# Patient Record
Sex: FEMALE | Race: WHITE | NOT HISPANIC OR LATINO | Employment: FULL TIME | URBAN - METROPOLITAN AREA
[De-identification: names, ages, dates, MRNs, and addresses within clinical notes are randomized per-mention and may not be internally consistent; named-entity substitution may affect disease eponyms.]

---

## 2022-02-14 ENCOUNTER — NON-PROVIDER VISIT (OUTPATIENT)
Dept: OCCUPATIONAL MEDICINE | Facility: CLINIC | Age: 32
End: 2022-02-14

## 2022-02-14 ENCOUNTER — EH NON-PROVIDER (OUTPATIENT)
Dept: OCCUPATIONAL MEDICINE | Facility: CLINIC | Age: 32
End: 2022-02-14

## 2022-02-14 PROCEDURE — 94375 RESPIRATORY FLOW VOLUME LOOP: CPT | Performed by: NURSE PRACTITIONER

## 2023-02-10 ENCOUNTER — DOCUMENTATION (OUTPATIENT)
Dept: OCCUPATIONAL MEDICINE | Facility: CLINIC | Age: 33
End: 2023-02-10

## 2023-02-10 NOTE — PROGRESS NOTES
Pt has an appointment for Pre-Employment Physical with [facility] on 02/14/23.    Currently missing the following vaccine documentation:      Flu Shot    Contacted via email on 02/10/23, requesting missing documentation. If unable to obtain by the appointment  date, declination, and/or vaccines will be given.

## 2023-02-14 ENCOUNTER — EH NON-PROVIDER (OUTPATIENT)
Dept: OCCUPATIONAL MEDICINE | Facility: CLINIC | Age: 33
End: 2023-02-14

## 2023-02-14 ENCOUNTER — HOSPITAL ENCOUNTER (OUTPATIENT)
Facility: MEDICAL CENTER | Age: 33
End: 2023-02-14
Attending: NURSE PRACTITIONER
Payer: COMMERCIAL

## 2023-02-14 ENCOUNTER — EMPLOYEE HEALTH (OUTPATIENT)
Dept: OCCUPATIONAL MEDICINE | Facility: CLINIC | Age: 33
End: 2023-02-14

## 2023-02-14 DIAGNOSIS — Z02.89 ENCOUNTER FOR OCCUPATIONAL HEALTH ASSESSMENT: ICD-10-CM

## 2023-02-14 DIAGNOSIS — Z02.1 PRE-EMPLOYMENT HEALTH SCREENING EXAMINATION: ICD-10-CM

## 2023-02-14 DIAGNOSIS — Z02.1 PRE-EMPLOYMENT DRUG SCREENING: ICD-10-CM

## 2023-02-14 LAB
AMP AMPHETAMINE: NORMAL
BAR BARBITURATES: NORMAL
BZO BENZODIAZEPINES: NORMAL
COC COCAINE: NORMAL
INT CON NEG: NORMAL
INT CON POS: NORMAL
MDMA ECSTASY: NORMAL
MET METHAMPHETAMINES: NORMAL
MTD METHADONE: NORMAL
OPI OPIATES: NORMAL
OXY OXYCODONE: NORMAL
PCP PHENCYCLIDINE: NORMAL
POC URINE DRUG SCREEN OCDRS: NEGATIVE
THC: NORMAL

## 2023-02-14 PROCEDURE — 8915 PR COMPREHENSIVE PHYSICAL: Performed by: NURSE PRACTITIONER

## 2023-02-14 PROCEDURE — 80305 DRUG TEST PRSMV DIR OPT OBS: CPT | Performed by: NURSE PRACTITIONER

## 2023-02-14 PROCEDURE — 94375 RESPIRATORY FLOW VOLUME LOOP: CPT | Performed by: NURSE PRACTITIONER

## 2023-02-14 PROCEDURE — 86480 TB TEST CELL IMMUN MEASURE: CPT | Performed by: NURSE PRACTITIONER

## 2023-02-17 ENCOUNTER — EH NON-PROVIDER (OUTPATIENT)
Dept: OCCUPATIONAL MEDICINE | Facility: CLINIC | Age: 33
End: 2023-02-17

## 2023-02-17 LAB
GAMMA INTERFERON BACKGROUND BLD IA-ACNC: 0.03 IU/ML
M TB IFN-G BLD-IMP: NEGATIVE
M TB IFN-G CD4+ BCKGRND COR BLD-ACNC: 0 IU/ML
MITOGEN IGNF BCKGRD COR BLD-ACNC: >10 IU/ML
QFT TB2 - NIL TBQ2: 0 IU/ML

## 2024-09-25 ENCOUNTER — IMMUNIZATION (OUTPATIENT)
Dept: OCCUPATIONAL MEDICINE | Facility: CLINIC | Age: 34
End: 2024-09-25

## 2024-09-25 DIAGNOSIS — Z23 NEED FOR VACCINATION: Primary | ICD-10-CM

## 2024-11-12 ENCOUNTER — APPOINTMENT (OUTPATIENT)
Dept: RADIOLOGY | Facility: MEDICAL CENTER | Age: 34
End: 2024-11-12
Attending: OBSTETRICS & GYNECOLOGY
Payer: COMMERCIAL

## 2024-11-12 ENCOUNTER — PHARMACY VISIT (OUTPATIENT)
Dept: PHARMACY | Facility: MEDICAL CENTER | Age: 34
End: 2024-11-12
Payer: COMMERCIAL

## 2024-11-12 ENCOUNTER — HOSPITAL ENCOUNTER (OUTPATIENT)
Facility: MEDICAL CENTER | Age: 34
End: 2024-11-12
Attending: OBSTETRICS & GYNECOLOGY | Admitting: OBSTETRICS & GYNECOLOGY
Payer: COMMERCIAL

## 2024-11-12 VITALS
RESPIRATION RATE: 17 BRPM | DIASTOLIC BLOOD PRESSURE: 51 MMHG | WEIGHT: 174 LBS | TEMPERATURE: 96.8 F | SYSTOLIC BLOOD PRESSURE: 93 MMHG | HEART RATE: 81 BPM | BODY MASS INDEX: 25.77 KG/M2 | HEIGHT: 69 IN | OXYGEN SATURATION: 97 %

## 2024-11-12 LAB
ALBUMIN SERPL BCP-MCNC: 3.6 G/DL (ref 3.2–4.9)
ALBUMIN/GLOB SERPL: 0.9 G/DL
ALP SERPL-CCNC: 94 U/L (ref 30–99)
ALT SERPL-CCNC: 34 U/L (ref 2–50)
ANION GAP SERPL CALC-SCNC: 12 MMOL/L (ref 7–16)
ANION GAP SERPL CALC-SCNC: 13 MMOL/L (ref 7–16)
APPEARANCE UR: ABNORMAL
AST SERPL-CCNC: 24 U/L (ref 12–45)
BASOPHILS # BLD AUTO: 0.6 % (ref 0–1.8)
BASOPHILS # BLD: 0.05 K/UL (ref 0–0.12)
BILIRUB SERPL-MCNC: 0.3 MG/DL (ref 0.1–1.5)
BILIRUB UR QL STRIP.AUTO: NEGATIVE
BUN SERPL-MCNC: 8 MG/DL (ref 8–22)
BUN SERPL-MCNC: 9 MG/DL (ref 8–22)
CALCIUM ALBUM COR SERPL-MCNC: 8.7 MG/DL (ref 8.5–10.5)
CALCIUM SERPL-MCNC: 8.2 MG/DL (ref 8.5–10.5)
CALCIUM SERPL-MCNC: 8.4 MG/DL (ref 8.5–10.5)
CHLORIDE SERPL-SCNC: 101 MMOL/L (ref 96–112)
CHLORIDE SERPL-SCNC: 105 MMOL/L (ref 96–112)
CO2 SERPL-SCNC: 20 MMOL/L (ref 20–33)
CO2 SERPL-SCNC: 22 MMOL/L (ref 20–33)
COLOR UR AUTO: ABNORMAL
CREAT SERPL-MCNC: 0.56 MG/DL (ref 0.5–1.4)
CREAT SERPL-MCNC: 0.75 MG/DL (ref 0.5–1.4)
EOSINOPHIL # BLD AUTO: 0.08 K/UL (ref 0–0.51)
EOSINOPHIL NFR BLD: 0.9 % (ref 0–6.9)
ERYTHROCYTE [DISTWIDTH] IN BLOOD BY AUTOMATED COUNT: 42.2 FL (ref 35.9–50)
GFR SERPLBLD CREATININE-BSD FMLA CKD-EPI: 107 ML/MIN/1.73 M 2
GFR SERPLBLD CREATININE-BSD FMLA CKD-EPI: 122 ML/MIN/1.73 M 2
GLOBULIN SER CALC-MCNC: 4.1 G/DL (ref 1.9–3.5)
GLUCOSE SERPL-MCNC: 92 MG/DL (ref 65–99)
GLUCOSE SERPL-MCNC: 94 MG/DL (ref 65–99)
GLUCOSE UR QL STRIP.AUTO: NEGATIVE MG/DL
HCT VFR BLD AUTO: 39.4 % (ref 37–47)
HGB BLD-MCNC: 12.9 G/DL (ref 12–16)
IMM GRANULOCYTES # BLD AUTO: 0.17 K/UL (ref 0–0.11)
IMM GRANULOCYTES NFR BLD AUTO: 1.9 % (ref 0–0.9)
KETONES UR QL STRIP.AUTO: ABNORMAL MG/DL
LEUKOCYTE ESTERASE UR QL STRIP.AUTO: NEGATIVE
LYMPHOCYTES # BLD AUTO: 0.76 K/UL (ref 1–4.8)
LYMPHOCYTES NFR BLD: 8.6 % (ref 22–41)
MCH RBC QN AUTO: 28.5 PG (ref 27–33)
MCHC RBC AUTO-ENTMCNC: 32.7 G/DL (ref 32.2–35.5)
MCV RBC AUTO: 87 FL (ref 81.4–97.8)
MONOCYTES # BLD AUTO: 0.71 K/UL (ref 0–0.85)
MONOCYTES NFR BLD AUTO: 8 % (ref 0–13.4)
NEUTROPHILS # BLD AUTO: 7.05 K/UL (ref 1.82–7.42)
NEUTROPHILS NFR BLD: 80 % (ref 44–72)
NITRITE UR QL STRIP.AUTO: NEGATIVE
NRBC # BLD AUTO: 0 K/UL
NRBC BLD-RTO: 0 /100 WBC (ref 0–0.2)
PH UR STRIP.AUTO: 6 [PH] (ref 5–8)
PLATELET # BLD AUTO: 323 K/UL (ref 164–446)
PMV BLD AUTO: 10.5 FL (ref 9–12.9)
POTASSIUM SERPL-SCNC: 3.2 MMOL/L (ref 3.6–5.5)
POTASSIUM SERPL-SCNC: 3.3 MMOL/L (ref 3.6–5.5)
PROT SERPL-MCNC: 7.7 G/DL (ref 6–8.2)
PROT UR QL STRIP: 30 MG/DL
RBC # BLD AUTO: 4.53 M/UL (ref 4.2–5.4)
RBC UR QL AUTO: NEGATIVE
SODIUM SERPL-SCNC: 136 MMOL/L (ref 135–145)
SODIUM SERPL-SCNC: 137 MMOL/L (ref 135–145)
SP GR UR STRIP.AUTO: >=1.03 (ref 1–1.03)
UROBILINOGEN UR STRIP.AUTO-MCNC: 1 MG/DL
WBC # BLD AUTO: 8.8 K/UL (ref 4.8–10.8)

## 2024-11-12 PROCEDURE — 96376 TX/PRO/DX INJ SAME DRUG ADON: CPT

## 2024-11-12 PROCEDURE — 700105 HCHG RX REV CODE 258: Performed by: OBSTETRICS & GYNECOLOGY

## 2024-11-12 PROCEDURE — 700111 HCHG RX REV CODE 636 W/ 250 OVERRIDE (IP): Performed by: OBSTETRICS & GYNECOLOGY

## 2024-11-12 PROCEDURE — 96374 THER/PROPH/DIAG INJ IV PUSH: CPT

## 2024-11-12 PROCEDURE — 96375 TX/PRO/DX INJ NEW DRUG ADDON: CPT

## 2024-11-12 PROCEDURE — 81002 URINALYSIS NONAUTO W/O SCOPE: CPT

## 2024-11-12 PROCEDURE — 80053 COMPREHEN METABOLIC PANEL: CPT

## 2024-11-12 PROCEDURE — 76819 FETAL BIOPHYS PROFIL W/O NST: CPT

## 2024-11-12 PROCEDURE — 85025 COMPLETE CBC W/AUTO DIFF WBC: CPT

## 2024-11-12 PROCEDURE — A9270 NON-COVERED ITEM OR SERVICE: HCPCS | Performed by: OBSTETRICS & GYNECOLOGY

## 2024-11-12 PROCEDURE — 76820 UMBILICAL ARTERY ECHO: CPT

## 2024-11-12 PROCEDURE — 80048 BASIC METABOLIC PNL TOTAL CA: CPT

## 2024-11-12 PROCEDURE — G0378 HOSPITAL OBSERVATION PER HR: HCPCS

## 2024-11-12 PROCEDURE — 700102 HCHG RX REV CODE 250 W/ 637 OVERRIDE(OP): Performed by: OBSTETRICS & GYNECOLOGY

## 2024-11-12 PROCEDURE — 99282 EMERGENCY DEPT VISIT SF MDM: CPT

## 2024-11-12 PROCEDURE — 36415 COLL VENOUS BLD VENIPUNCTURE: CPT

## 2024-11-12 PROCEDURE — 700111 HCHG RX REV CODE 636 W/ 250 OVERRIDE (IP): Mod: JZ | Performed by: OBSTETRICS & GYNECOLOGY

## 2024-11-12 PROCEDURE — 770002 HCHG ROOM/CARE - OB PRIVATE (112)

## 2024-11-12 PROCEDURE — RXMED WILLOW AMBULATORY MEDICATION CHARGE: Performed by: OBSTETRICS & GYNECOLOGY

## 2024-11-12 RX ORDER — LEVOTHYROXINE SODIUM 300 UG/1
300 TABLET ORAL
COMMUNITY

## 2024-11-12 RX ORDER — ACETAMINOPHEN 500 MG
1000 TABLET ORAL EVERY 6 HOURS PRN
Status: DISCONTINUED | OUTPATIENT
Start: 2024-11-12 | End: 2024-11-12

## 2024-11-12 RX ORDER — ONDANSETRON 2 MG/ML
4 INJECTION INTRAMUSCULAR; INTRAVENOUS EVERY 4 HOURS PRN
Status: DISCONTINUED | OUTPATIENT
Start: 2024-11-12 | End: 2024-11-12

## 2024-11-12 RX ORDER — ONDANSETRON 4 MG/1
4 TABLET, ORALLY DISINTEGRATING ORAL EVERY 4 HOURS PRN
Status: DISCONTINUED | OUTPATIENT
Start: 2024-11-12 | End: 2024-11-12 | Stop reason: HOSPADM

## 2024-11-12 RX ORDER — ONDANSETRON 4 MG/1
4 TABLET, ORALLY DISINTEGRATING ORAL EVERY 6 HOURS PRN
Qty: 10 TABLET | Refills: 1 | Status: SHIPPED | OUTPATIENT
Start: 2024-11-12

## 2024-11-12 RX ORDER — CALCIUM CARBONATE 500 MG/1
500 TABLET, CHEWABLE ORAL DAILY
Status: DISCONTINUED | OUTPATIENT
Start: 2024-11-12 | End: 2024-11-12 | Stop reason: HOSPADM

## 2024-11-12 RX ORDER — ONDANSETRON 2 MG/ML
4 INJECTION INTRAMUSCULAR; INTRAVENOUS EVERY 4 HOURS PRN
Status: DISCONTINUED | OUTPATIENT
Start: 2024-11-12 | End: 2024-11-12 | Stop reason: HOSPADM

## 2024-11-12 RX ORDER — LEVOTHYROXINE SODIUM 150 UG/1
150 TABLET ORAL
COMMUNITY

## 2024-11-12 RX ORDER — POTASSIUM CHLORIDE 1500 MG/1
40 TABLET, EXTENDED RELEASE ORAL DAILY
Status: DISCONTINUED | OUTPATIENT
Start: 2024-11-12 | End: 2024-11-12 | Stop reason: HOSPADM

## 2024-11-12 RX ORDER — POTASSIUM CHLORIDE 1500 MG/1
20 TABLET, EXTENDED RELEASE ORAL DAILY
Status: DISCONTINUED | OUTPATIENT
Start: 2024-11-12 | End: 2024-11-12

## 2024-11-12 RX ORDER — SODIUM CHLORIDE, SODIUM LACTATE, POTASSIUM CHLORIDE, AND CALCIUM CHLORIDE .6; .31; .03; .02 G/100ML; G/100ML; G/100ML; G/100ML
1000 INJECTION, SOLUTION INTRAVENOUS ONCE
Status: COMPLETED | OUTPATIENT
Start: 2024-11-12 | End: 2024-11-12

## 2024-11-12 RX ORDER — ACETAMINOPHEN 500 MG
1000 TABLET ORAL EVERY 6 HOURS PRN
Status: DISCONTINUED | OUTPATIENT
Start: 2024-11-12 | End: 2024-11-12 | Stop reason: HOSPADM

## 2024-11-12 RX ORDER — METOCLOPRAMIDE HYDROCHLORIDE 5 MG/ML
10 INJECTION INTRAMUSCULAR; INTRAVENOUS ONCE
Status: DISCONTINUED | OUTPATIENT
Start: 2024-11-12 | End: 2024-11-12

## 2024-11-12 RX ADMIN — SODIUM CHLORIDE, POTASSIUM CHLORIDE, SODIUM LACTATE AND CALCIUM CHLORIDE 1000 ML: 600; 310; 30; 20 INJECTION, SOLUTION INTRAVENOUS at 02:30

## 2024-11-12 RX ADMIN — ONDANSETRON 4 MG: 2 INJECTION INTRAMUSCULAR; INTRAVENOUS at 07:55

## 2024-11-12 RX ADMIN — ACETAMINOPHEN 1000 MG: 500 TABLET ORAL at 06:29

## 2024-11-12 RX ADMIN — ACETAMINOPHEN 1000 MG: 500 TABLET ORAL at 15:39

## 2024-11-12 RX ADMIN — ONDANSETRON 4 MG: 2 INJECTION INTRAMUSCULAR; INTRAVENOUS at 03:33

## 2024-11-12 RX ADMIN — ANTACID TABLETS 500 MG: 500 TABLET, CHEWABLE ORAL at 04:11

## 2024-11-12 RX ADMIN — ONDANSETRON 4 MG: 4 TABLET, ORALLY DISINTEGRATING ORAL at 15:39

## 2024-11-12 RX ADMIN — POTASSIUM CHLORIDE 40 MEQ: 1500 TABLET, EXTENDED RELEASE ORAL at 08:38

## 2024-11-12 SDOH — ECONOMIC STABILITY: TRANSPORTATION INSECURITY
IN THE PAST 12 MONTHS, HAS LACK OF RELIABLE TRANSPORTATION KEPT YOU FROM MEDICAL APPOINTMENTS, MEETINGS, WORK OR FROM GETTING THINGS NEEDED FOR DAILY LIVING?: NO

## 2024-11-12 SDOH — ECONOMIC STABILITY: TRANSPORTATION INSECURITY
IN THE PAST 12 MONTHS, HAS THE LACK OF TRANSPORTATION KEPT YOU FROM MEDICAL APPOINTMENTS OR FROM GETTING MEDICATIONS?: NO

## 2024-11-12 ASSESSMENT — LIFESTYLE VARIABLES
TOTAL SCORE: 0
EVER HAD A DRINK FIRST THING IN THE MORNING TO STEADY YOUR NERVES TO GET RID OF A HANGOVER: NO
AVERAGE NUMBER OF DAYS PER WEEK YOU HAVE A DRINK CONTAINING ALCOHOL: 0
EVER FELT BAD OR GUILTY ABOUT YOUR DRINKING: NO
ON A TYPICAL DAY WHEN YOU DRINK ALCOHOL HOW MANY DRINKS DO YOU HAVE: 0
HAVE PEOPLE ANNOYED YOU BY CRITICIZING YOUR DRINKING: NO
CONSUMPTION TOTAL: NEGATIVE
ALCOHOL_USE: NO
HOW MANY TIMES IN THE PAST YEAR HAVE YOU HAD 5 OR MORE DRINKS IN A DAY: 0
HAVE YOU EVER FELT YOU SHOULD CUT DOWN ON YOUR DRINKING: NO
TOTAL SCORE: 0
TOTAL SCORE: 0

## 2024-11-12 ASSESSMENT — SOCIAL DETERMINANTS OF HEALTH (SDOH)
WITHIN THE LAST YEAR, HAVE YOU BEEN AFRAID OF YOUR PARTNER OR EX-PARTNER?: NO
WITHIN THE LAST YEAR, HAVE YOU BEEN HUMILIATED OR EMOTIONALLY ABUSED IN OTHER WAYS BY YOUR PARTNER OR EX-PARTNER?: NO
IN THE PAST 12 MONTHS, HAS THE ELECTRIC, GAS, OIL, OR WATER COMPANY THREATENED TO SHUT OFF SERVICE IN YOUR HOME?: NO
WITHIN THE LAST YEAR, HAVE TO BEEN RAPED OR FORCED TO HAVE ANY KIND OF SEXUAL ACTIVITY BY YOUR PARTNER OR EX-PARTNER?: NO
WITHIN THE LAST YEAR, HAVE YOU BEEN KICKED, HIT, SLAPPED, OR OTHERWISE PHYSICALLY HURT BY YOUR PARTNER OR EX-PARTNER?: NO
WITHIN THE PAST 12 MONTHS, THE FOOD YOU BOUGHT JUST DIDN'T LAST AND YOU DIDN'T HAVE MONEY TO GET MORE: NEVER TRUE
WITHIN THE PAST 12 MONTHS, YOU WORRIED THAT YOUR FOOD WOULD RUN OUT BEFORE YOU GOT THE MONEY TO BUY MORE: NEVER TRUE

## 2024-11-12 ASSESSMENT — PAIN DESCRIPTION - PAIN TYPE: TYPE: ACUTE PAIN

## 2024-11-12 ASSESSMENT — PATIENT HEALTH QUESTIONNAIRE - PHQ9
SUM OF ALL RESPONSES TO PHQ9 QUESTIONS 1 AND 2: 0
2. FEELING DOWN, DEPRESSED, IRRITABLE, OR HOPELESS: NOT AT ALL
1. LITTLE INTEREST OR PLEASURE IN DOING THINGS: NOT AT ALL

## 2024-11-12 ASSESSMENT — FIBROSIS 4 INDEX: FIB4 SCORE: 0.39

## 2024-11-12 NOTE — PROGRESS NOTES
Pt presents to [facility] c/o nausea, vomiting and GI upset since Saturday. Pt denies vaginal leaking, bleeding or ctx. FM+. Pt states she has had a low grade fever od 100.5f. pt placed on monitors x2.

## 2024-11-12 NOTE — CARE PLAN
The patient is Watcher - Medium risk of patient condition declining or worsening    Shift Goals  Clinical Goals: Reassuring FHT  Patient Goals: remain pregnant  Family Goals: support at bedside    Progress made toward(s) clinical / shift goals:    Problem: Knowledge Deficit - L&D  Goal: Patient and family/caregivers will demonstrate understanding of plan of care, disease process/condition, diagnostic tests and medications  Outcome: Progressing  Note: Pt able to verbalize basic understanding of fetal monitoring and BPP results. Pt able to verbalize understanding of POC      Problem: Gastrointestinal Irritability  Goal: Nausea and vomiting will be absent or improve  Outcome: Progressing  Note: Pt able to tolerate minimal PO intake at this time. Continue to advance diet as able to tolerate       Patient is not progressing towards the following goals:

## 2024-11-12 NOTE — ED PROVIDER NOTES
"S: Pt is a 34 y.o.  at 27w5d with Estimated Date of Delivery: 25 who presents to triage c/o GI \"bug\" since Saturday. States she was able to keep some fluids down yesterday but has had trouble today. Fever at home 100.5.     Denies VB, RUCs, LOF.  Reports normal FM.      O: Temp 37.3 °C (99.2 °F) (Temporal)   Resp 16   Ht 1.753 m (5' 9\")   Wt 78.9 kg (174 lb)   BMI 25.70 kg/m²          NST: Done and read by me         Indication: gastroenteritis affecting pregnancy       FHR: 145       Variability: moderate       Acclerations: present       Decelerations: occasional mild variable and one deceleration down to 120 lasting approximately one minute, no associated with contraction       Reactive: yes/ reassuring for gestational age         [place]: no UCs       SVE: deferred    Gen: A&Ox3 NAD  CV: no edema or cyanosis  Resp: normal effort  Abd: soft, NT, gravid  Ext: no edema or TTP  Psych: normal mood and affect      Lab Results   Component Value Date/Time    WBC 8.8 2024 01:55 AM    RBC 4.53 2024 01:55 AM    HEMOGLOBIN 12.9 2024 01:55 AM    HEMATOCRIT 39.4 2024 01:55 AM    MCV 87.0 2024 01:55 AM    MCH 28.5 2024 01:55 AM    MCHC 32.7 2024 01:55 AM    MPV 10.5 2024 01:55 AM    NEUTSPOLYS 80.00 (H) 2024 01:55 AM    LYMPHOCYTES 8.60 (L) 2024 01:55 AM    MONOCYTES 8.00 2024 01:55 AM    EOSINOPHILS 0.90 2024 01:55 AM    BASOPHILS 0.60 2024 01:55 AM         Lab Results   Component Value Date/Time    SODIUM 136 2024 01:55 AM    POTASSIUM 3.2 (L) 2024 01:55 AM    CHLORIDE 101 2024 01:55 AM    CO2 22 2024 01:55 AM    GLUCOSE 92 2024 01:55 AM    BUN 9 2024 01:55 AM    CREATININE 0.75 2024 01:55 AM    BUNCREATRAT 13 2024 02:41 PM              A/P  There are no active problems to display for this patient.      1.  IUP @ 27w5d  2.  BPP ordered for occasional mild decelerations, could be appropriate " for gestational age.   3.  Viral gastroenteritis of pregnancy- CBC and CMP ordered to rule out other etiologies and check for electrolyte imbalances. WNL but calcium slightly low. Will give one tums after zofran is given.   4.  IV fluids. IV zofran.   5.  F/u in the office as routinely scheduled if BPP reassuring.     Evaluation and clinical decision making , including analysis of Fetal data and maternal lab work completed over a 1 hour period.       Willow Stringer DO

## 2024-11-12 NOTE — H&P
"S: Pt is a 34 y.o.  at 27w5d with Estimated Date of Delivery: 25 who presents to triage c/o GI \"bug\" since Saturday. States she was able to keep some fluids down yesterday but has had trouble today. Fever at home 100.5.     Denies VB, RUCs, LOF.  Reports normal FM.       O: Temp 37.3 °C (99.2 °F) (Temporal)   Resp 16   Ht 1.753 m (5' 9\")   Wt 78.9 kg (174 lb)   BMI 25.70 kg/m²           NST: Done and read by me          Indication: gastroenteritis affecting pregnancy       FHR: 145       Variability: moderate       Acclerations: present       Decelerations: occasional mild variable and one deceleration down to 120 lasting approximately one minute, no associated with contraction       Reactive: yes/ reassuring for gestational age          [place]: no UCs       SVE: deferred     Gen: A&Ox3 NAD  CV: no edema or cyanosis  Resp: normal effort  Abd: soft, NT, gravid  Ext: no edema or TTP  Psych: normal mood and affect              Lab Results   Component Value Date/Time     WBC 8.8 2024 01:55 AM     RBC 4.53 2024 01:55 AM     HEMOGLOBIN 12.9 2024 01:55 AM     HEMATOCRIT 39.4 2024 01:55 AM     MCV 87.0 2024 01:55 AM     MCH 28.5 2024 01:55 AM     MCHC 32.7 2024 01:55 AM     MPV 10.5 2024 01:55 AM     NEUTSPOLYS 80.00 (H) 2024 01:55 AM     LYMPHOCYTES 8.60 (L) 2024 01:55 AM     MONOCYTES 8.00 2024 01:55 AM     EOSINOPHILS 0.90 2024 01:55 AM     BASOPHILS 0.60 2024 01:55 AM                Lab Results   Component Value Date/Time     SODIUM 136 2024 01:55 AM     POTASSIUM 3.2 (L) 2024 01:55 AM     CHLORIDE 101 2024 01:55 AM     CO2 22 2024 01:55 AM     GLUCOSE 92 2024 01:55 AM     BUN 9 2024 01:55 AM     CREATININE 0.75 2024 01:55 AM     BUNCREATRAT 13 2024 02:41 PM   O+, antibody screen neg, RI, ID labs neg, [facility] Normal NT. 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 \"[place]\"           A/P  There are no active problems to display for this patient.        1.  IUP @ 27w5d  2.  BPP 4/8. Off for movement and breathing. Suspect this is secondary to dehydration for several days. Plan to keep for IV Fluids and Zofran with plan to repeat BPP in 12-24 hours. S:D ratios WNL. If concern for delivery, will do BMZ and MgSO4.   3. Hypothyroid- continue levothyroxine 150 mcg daily  4. SCDs for VTE ppx       Willow Stringer DO  "

## 2024-11-12 NOTE — PROGRESS NOTES
0145: MD Stacia to bedside. Orders for CBC, CMP, LR IV bolus.    0325: MD Stacia notified of FHT. Orders for BPP.    0420: Ultrasound technologist to bedside.    0530: MD Stacia notified of BPP results of 4/8. Orders to admit patient until repeat BPP can be performed.     0538: Report given to Marianne David RN.

## 2024-11-12 NOTE — PROGRESS NOTES
0700- Report from Marianne David. Care assumed.     1855- Report to LIA Lepe. Care relinquished. Dr Myers at bedside. Per MD may remove monitors as pt is post ultrasound

## 2024-11-12 NOTE — PROGRESS NOTES
1107- received report from Mikayla Owens  0668 pt oriented to room, call light and floor procedures . All questions and concerns addressed and answered.  0700- report and relinquish of care given to Opal Owens

## 2024-11-13 NOTE — PROGRESS NOTES
1855: Report received from Opal KAUR RN; POC reviewed. Dr Abdalla @ BS discussing POC.    1925: BPP 8/8; IV removed intact.    2003: Discharge orders in per [facility]    2012: Call to out-patient pharmacy inquiring on meds -to- bed. Informed there is about a 30 min wait. Discussed with pt; pt will wait on unit for 20 min and then go  her meds by her choice.    2053: pt ambulated with spouse off unit; will go down to pharmacy to  meds.  Discharge instructions reviewed with pt and spouse; PTL precautions reviewed. All questions answered.

## 2024-11-13 NOTE — DISCHARGE SUMMARY
"Discharge Summary    Admission Date: 11/12/24    Discharge Date: 11/12/24    Diagnosis:  IUP at 27+5 weeks  Category 2 FHR - resolved  Viral gastroenteritis  Hypokalemia    Procedures:  none    Subjective: Pt doing well. No CTX, VB or LOF. Baby moving well. Eating, voiding and ambulating without difficulty. Still has some diarrhea but it is improving. Feels much better. Desires discharge home today.    BP 93/51   Pulse 81   Temp 36 °C (96.8 °F) (Temporal)   Resp 17   Ht 1.753 m (5' 9\")   Wt 78.9 kg (174 lb)   SpO2 97%   BMI 25.70 kg/m²     GEN: NAD, comfortable  GI: soft, NT, ND  Gu: fundus firm, nontender  EXT: nontender, no edema    NST Report Review - per my read:  FHR - 130s, pos accels, neg decels, moderate variability, reactive, category 1  [place] - no contractions    Hospital Course: The patient presented to L&D with complaint of nausea and vomiting. She received IV fluids and anti-emetics. On FHR monitoring there was a FHR deceleration so a BPP was done which was 4/8. She was admitted for observation and continuous monitoring. She had no further decels. 12 hours later repeat BPP was 8/8. Her N/V resolved. She was feeling much better and stable for d/c to home.    Discharge Instructions   1. Diet : bland, BRAT diet  2. Activity: regular activity  3. Probiotics and Imodium prn diarrhea.    Current Outpatient Medications   Medication Sig Dispense Refill    ondansetron (ZOFRAN ODT) 4 MG TABLET DISPERSIBLE Take 1 Tablet by mouth every 6 hours as needed for Nausea/Vomiting. 10 Tablet 1       Follow up: as scheduled with Dr. Pressley for routine prenatal care    Complications: none      Mavis Abdalla M.D.    "

## 2024-11-13 NOTE — DISCHARGE INSTRUCTIONS
Pre-term Labor (<37 weeks):  Call your physician or return to the hospital if:  You have painless regular contractions more than 4 in one hour.  Your water breaks (remember time and color).  You have menstrual-like cramps, a low dull backache or pressure in your pelvis or back.  Your baby does not move enough to complete the daily kick count (10 movements in 2 hours).  Your baby moves much less often than on the days before or you have not felt your baby move all day.  Please review the MEDICATION LIST section of your AFTER VISIT SUMMARY document.  Take your medication as prescribed  Please return to unit for any problems or concerns with the pregnancy.

## 2025-01-27 ENCOUNTER — APPOINTMENT (OUTPATIENT)
Dept: PEDIATRICS | Facility: PHYSICIAN GROUP | Age: 35
End: 2025-01-27
Payer: COMMERCIAL

## 2025-01-30 ENCOUNTER — HOSPITAL ENCOUNTER (EMERGENCY)
Facility: MEDICAL CENTER | Age: 35
End: 2025-01-31
Attending: OBSTETRICS & GYNECOLOGY | Admitting: OBSTETRICS & GYNECOLOGY
Payer: COMMERCIAL

## 2025-01-30 PROCEDURE — 302449 STATCHG TRIAGE ONLY (STATISTIC)

## 2025-01-30 ASSESSMENT — FIBROSIS 4 INDEX: FIB4 SCORE: 0.43

## 2025-01-31 ENCOUNTER — HOSPITAL ENCOUNTER (INPATIENT)
Facility: MEDICAL CENTER | Age: 35
LOS: 1 days | End: 2025-02-01
Attending: OBSTETRICS & GYNECOLOGY | Admitting: OBSTETRICS & GYNECOLOGY
Payer: COMMERCIAL

## 2025-01-31 VITALS
HEIGHT: 69 IN | DIASTOLIC BLOOD PRESSURE: 81 MMHG | WEIGHT: 200 LBS | OXYGEN SATURATION: 96 % | RESPIRATION RATE: 16 BRPM | BODY MASS INDEX: 29.62 KG/M2 | HEART RATE: 92 BPM | TEMPERATURE: 97.8 F | SYSTOLIC BLOOD PRESSURE: 133 MMHG

## 2025-01-31 VITALS
DIASTOLIC BLOOD PRESSURE: 81 MMHG | HEART RATE: 104 BPM | WEIGHT: 200 LBS | TEMPERATURE: 97 F | SYSTOLIC BLOOD PRESSURE: 118 MMHG | BODY MASS INDEX: 29.62 KG/M2 | HEIGHT: 69 IN | RESPIRATION RATE: 18 BRPM

## 2025-01-31 LAB
BASOPHILS # BLD AUTO: 0.2 % (ref 0–1.8)
BASOPHILS # BLD: 0.03 K/UL (ref 0–0.12)
EOSINOPHIL # BLD AUTO: 0.1 K/UL (ref 0–0.51)
EOSINOPHIL NFR BLD: 0.7 % (ref 0–6.9)
ERYTHROCYTE [DISTWIDTH] IN BLOOD BY AUTOMATED COUNT: 44.1 FL (ref 35.9–50)
HCT VFR BLD AUTO: 35.6 % (ref 37–47)
HGB BLD-MCNC: 11.4 G/DL (ref 12–16)
HOLDING TUBE BB 8507: NORMAL
IMM GRANULOCYTES # BLD AUTO: 0.16 K/UL (ref 0–0.11)
IMM GRANULOCYTES NFR BLD AUTO: 1.1 % (ref 0–0.9)
LYMPHOCYTES # BLD AUTO: 1.36 K/UL (ref 1–4.8)
LYMPHOCYTES NFR BLD: 9.2 % (ref 22–41)
MCH RBC QN AUTO: 25.3 PG (ref 27–33)
MCHC RBC AUTO-ENTMCNC: 32 G/DL (ref 32.2–35.5)
MCV RBC AUTO: 78.9 FL (ref 81.4–97.8)
MONOCYTES # BLD AUTO: 0.85 K/UL (ref 0–0.85)
MONOCYTES NFR BLD AUTO: 5.7 % (ref 0–13.4)
NEUTROPHILS # BLD AUTO: 12.35 K/UL (ref 1.82–7.42)
NEUTROPHILS NFR BLD: 83.1 % (ref 44–72)
NRBC # BLD AUTO: 0 K/UL
NRBC BLD-RTO: 0 /100 WBC (ref 0–0.2)
PLATELET # BLD AUTO: 289 K/UL (ref 164–446)
PMV BLD AUTO: 12.7 FL (ref 9–12.9)
RBC # BLD AUTO: 4.51 M/UL (ref 4.2–5.4)
T PALLIDUM AB SER QL IA: NORMAL
WBC # BLD AUTO: 14.9 K/UL (ref 4.8–10.8)

## 2025-01-31 PROCEDURE — 700102 HCHG RX REV CODE 250 W/ 637 OVERRIDE(OP): Performed by: OBSTETRICS & GYNECOLOGY

## 2025-01-31 PROCEDURE — 86780 TREPONEMA PALLIDUM: CPT

## 2025-01-31 PROCEDURE — 36415 COLL VENOUS BLD VENIPUNCTURE: CPT

## 2025-01-31 PROCEDURE — A9270 NON-COVERED ITEM OR SERVICE: HCPCS | Performed by: OBSTETRICS & GYNECOLOGY

## 2025-01-31 PROCEDURE — 3E0234Z INTRODUCTION OF SERUM, TOXOID AND VACCINE INTO MUSCLE, PERCUTANEOUS APPROACH: ICD-10-PCS | Performed by: OBSTETRICS & GYNECOLOGY

## 2025-01-31 PROCEDURE — 700105 HCHG RX REV CODE 258: Performed by: OBSTETRICS & GYNECOLOGY

## 2025-01-31 PROCEDURE — 304965 HCHG RECOVERY SERVICES

## 2025-01-31 PROCEDURE — 85025 COMPLETE CBC W/AUTO DIFF WBC: CPT

## 2025-01-31 PROCEDURE — 59025 FETAL NON-STRESS TEST: CPT

## 2025-01-31 PROCEDURE — 770002 HCHG ROOM/CARE - OB PRIVATE (112)

## 2025-01-31 PROCEDURE — 302449 STATCHG TRIAGE ONLY (STATISTIC)

## 2025-01-31 PROCEDURE — 700111 HCHG RX REV CODE 636 W/ 250 OVERRIDE (IP): Performed by: OBSTETRICS & GYNECOLOGY

## 2025-01-31 PROCEDURE — 0KQM0ZZ REPAIR PERINEUM MUSCLE, OPEN APPROACH: ICD-10-PCS | Performed by: OBSTETRICS & GYNECOLOGY

## 2025-01-31 PROCEDURE — 59409 OBSTETRICAL CARE: CPT

## 2025-01-31 RX ORDER — OXYTOCIN 10 [USP'U]/ML
10 INJECTION, SOLUTION INTRAMUSCULAR; INTRAVENOUS
Status: DISCONTINUED | OUTPATIENT
Start: 2025-01-31 | End: 2025-01-31 | Stop reason: HOSPADM

## 2025-01-31 RX ORDER — SODIUM CHLORIDE, SODIUM LACTATE, POTASSIUM CHLORIDE, AND CALCIUM CHLORIDE .6; .31; .03; .02 G/100ML; G/100ML; G/100ML; G/100ML
250 INJECTION, SOLUTION INTRAVENOUS PRN
Status: DISCONTINUED | OUTPATIENT
Start: 2025-01-31 | End: 2025-01-31

## 2025-01-31 RX ORDER — SIMETHICONE 125 MG
125 TABLET,CHEWABLE ORAL 4 TIMES DAILY PRN
Status: DISCONTINUED | OUTPATIENT
Start: 2025-01-31 | End: 2025-02-01 | Stop reason: HOSPADM

## 2025-01-31 RX ORDER — ROPIVACAINE HYDROCHLORIDE 2 MG/ML
INJECTION, SOLUTION EPIDURAL; INFILTRATION; PERINEURAL CONTINUOUS
Status: DISCONTINUED | OUTPATIENT
Start: 2025-01-31 | End: 2025-01-31

## 2025-01-31 RX ORDER — CITRIC ACID/SODIUM CITRATE 334-500MG
30 SOLUTION, ORAL ORAL EVERY 6 HOURS PRN
Status: DISCONTINUED | OUTPATIENT
Start: 2025-01-31 | End: 2025-01-31 | Stop reason: HOSPADM

## 2025-01-31 RX ORDER — METHYLERGONOVINE MALEATE 0.2 MG/ML
0.2 INJECTION INTRAVENOUS
Status: DISCONTINUED | OUTPATIENT
Start: 2025-01-31 | End: 2025-01-31 | Stop reason: HOSPADM

## 2025-01-31 RX ORDER — CALCIUM CARBONATE 500 MG/1
1000 TABLET, CHEWABLE ORAL EVERY 6 HOURS PRN
Status: DISCONTINUED | OUTPATIENT
Start: 2025-01-31 | End: 2025-02-01 | Stop reason: HOSPADM

## 2025-01-31 RX ORDER — MISOPROSTOL 200 UG/1
800 TABLET ORAL
Status: DISCONTINUED | OUTPATIENT
Start: 2025-01-31 | End: 2025-01-31 | Stop reason: HOSPADM

## 2025-01-31 RX ORDER — SODIUM CHLORIDE, SODIUM LACTATE, POTASSIUM CHLORIDE, CALCIUM CHLORIDE 600; 310; 30; 20 MG/100ML; MG/100ML; MG/100ML; MG/100ML
2000 INJECTION, SOLUTION INTRAVENOUS PRN
Status: DISCONTINUED | OUTPATIENT
Start: 2025-01-31 | End: 2025-02-01 | Stop reason: HOSPADM

## 2025-01-31 RX ORDER — ONDANSETRON 4 MG/1
4 TABLET, ORALLY DISINTEGRATING ORAL EVERY 6 HOURS PRN
Status: DISCONTINUED | OUTPATIENT
Start: 2025-01-31 | End: 2025-01-31 | Stop reason: HOSPADM

## 2025-01-31 RX ORDER — ALUMINA, MAGNESIA, AND SIMETHICONE 2400; 2400; 240 MG/30ML; MG/30ML; MG/30ML
30 SUSPENSION ORAL EVERY 6 HOURS PRN
Status: DISCONTINUED | OUTPATIENT
Start: 2025-01-31 | End: 2025-01-31 | Stop reason: HOSPADM

## 2025-01-31 RX ORDER — CALCIUM CARBONATE 500 MG/1
1000 TABLET, CHEWABLE ORAL DAILY
Status: DISCONTINUED | OUTPATIENT
Start: 2025-01-31 | End: 2025-01-31 | Stop reason: HOSPADM

## 2025-01-31 RX ORDER — MISOPROSTOL 200 UG/1
800 TABLET ORAL
Status: DISCONTINUED | OUTPATIENT
Start: 2025-01-31 | End: 2025-02-01 | Stop reason: HOSPADM

## 2025-01-31 RX ORDER — VITAMIN A ACETATE, BETA CAROTENE, ASCORBIC ACID, CHOLECALCIFEROL, .ALPHA.-TOCOPHEROL ACETATE, DL-, THIAMINE MONONITRATE, RIBOFLAVIN, NIACINAMIDE, PYRIDOXINE HYDROCHLORIDE, FOLIC ACID, CYANOCOBALAMIN, CALCIUM CARBONATE, FERROUS FUMARATE, ZINC OXIDE, CUPRIC OXIDE 3080; 12; 120; 400; 1; 1.84; 3; 20; 22; 920; 25; 200; 27; 10; 2 [IU]/1; UG/1; MG/1; [IU]/1; MG/1; MG/1; MG/1; MG/1; MG/1; [IU]/1; MG/1; MG/1; MG/1; MG/1; MG/1
1 TABLET, FILM COATED ORAL
Status: DISCONTINUED | OUTPATIENT
Start: 2025-01-31 | End: 2025-02-01 | Stop reason: HOSPADM

## 2025-01-31 RX ORDER — DOCUSATE SODIUM 100 MG/1
100 CAPSULE, LIQUID FILLED ORAL 2 TIMES DAILY PRN
Status: DISCONTINUED | OUTPATIENT
Start: 2025-01-31 | End: 2025-02-01 | Stop reason: HOSPADM

## 2025-01-31 RX ORDER — ACETAMINOPHEN 500 MG
1000 TABLET ORAL EVERY 6 HOURS PRN
Status: DISCONTINUED | OUTPATIENT
Start: 2025-01-31 | End: 2025-02-01 | Stop reason: HOSPADM

## 2025-01-31 RX ORDER — ACETAMINOPHEN 500 MG
1000 TABLET ORAL
Status: DISCONTINUED | OUTPATIENT
Start: 2025-01-31 | End: 2025-01-31 | Stop reason: HOSPADM

## 2025-01-31 RX ORDER — BUPIVACAINE HYDROCHLORIDE 2.5 MG/ML
INJECTION, SOLUTION EPIDURAL; INFILTRATION; INTRACAUDAL
Status: DISCONTINUED
Start: 2025-01-31 | End: 2025-01-31

## 2025-01-31 RX ORDER — EPHEDRINE SULFATE 50 MG/ML
5 INJECTION, SOLUTION INTRAVENOUS
Status: DISCONTINUED | OUTPATIENT
Start: 2025-01-31 | End: 2025-01-31

## 2025-01-31 RX ORDER — IBUPROFEN 800 MG/1
800 TABLET, FILM COATED ORAL EVERY 8 HOURS PRN
Status: DISCONTINUED | OUTPATIENT
Start: 2025-01-31 | End: 2025-02-01 | Stop reason: HOSPADM

## 2025-01-31 RX ORDER — OXYCODONE HYDROCHLORIDE 5 MG/1
5 TABLET ORAL EVERY 4 HOURS PRN
Status: DISCONTINUED | OUTPATIENT
Start: 2025-01-31 | End: 2025-02-01 | Stop reason: HOSPADM

## 2025-01-31 RX ORDER — IBUPROFEN 800 MG/1
800 TABLET, FILM COATED ORAL
Status: COMPLETED | OUTPATIENT
Start: 2025-01-31 | End: 2025-01-31

## 2025-01-31 RX ORDER — LIDOCAINE HYDROCHLORIDE 10 MG/ML
20 INJECTION, SOLUTION INFILTRATION; PERINEURAL
Status: DISCONTINUED | OUTPATIENT
Start: 2025-01-31 | End: 2025-01-31

## 2025-01-31 RX ORDER — ONDANSETRON 2 MG/ML
4 INJECTION INTRAMUSCULAR; INTRAVENOUS EVERY 6 HOURS PRN
Status: DISCONTINUED | OUTPATIENT
Start: 2025-01-31 | End: 2025-01-31 | Stop reason: HOSPADM

## 2025-01-31 RX ORDER — SODIUM CHLORIDE, SODIUM LACTATE, POTASSIUM CHLORIDE, AND CALCIUM CHLORIDE .6; .31; .03; .02 G/100ML; G/100ML; G/100ML; G/100ML
1000 INJECTION, SOLUTION INTRAVENOUS
Status: DISCONTINUED | OUTPATIENT
Start: 2025-01-31 | End: 2025-01-31

## 2025-01-31 RX ORDER — TERBUTALINE SULFATE 1 MG/ML
0.25 INJECTION SUBCUTANEOUS
Status: DISCONTINUED | OUTPATIENT
Start: 2025-01-31 | End: 2025-01-31

## 2025-01-31 RX ORDER — SODIUM CHLORIDE, SODIUM LACTATE, POTASSIUM CHLORIDE, CALCIUM CHLORIDE 600; 310; 30; 20 MG/100ML; MG/100ML; MG/100ML; MG/100ML
INJECTION, SOLUTION INTRAVENOUS CONTINUOUS
Status: DISCONTINUED | OUTPATIENT
Start: 2025-01-31 | End: 2025-02-01 | Stop reason: HOSPADM

## 2025-01-31 RX ORDER — LEVOTHYROXINE SODIUM 150 UG/1
150 TABLET ORAL
Status: DISCONTINUED | OUTPATIENT
Start: 2025-02-01 | End: 2025-02-01 | Stop reason: HOSPADM

## 2025-01-31 RX ADMIN — OXYTOCIN 125 ML/HR: 10 INJECTION, SOLUTION INTRAMUSCULAR; INTRAVENOUS at 14:53

## 2025-01-31 RX ADMIN — ACETAMINOPHEN 1000 MG: 500 TABLET ORAL at 19:46

## 2025-01-31 RX ADMIN — OXYTOCIN 10 UNITS: 10 INJECTION, SOLUTION INTRAMUSCULAR; INTRAVENOUS at 17:37

## 2025-01-31 RX ADMIN — OXYTOCIN 20 UNITS: 10 INJECTION, SOLUTION INTRAMUSCULAR; INTRAVENOUS at 17:38

## 2025-01-31 RX ADMIN — OXYTOCIN 125 ML/HR: 10 INJECTION, SOLUTION INTRAMUSCULAR; INTRAVENOUS at 18:25

## 2025-01-31 RX ADMIN — OXYTOCIN 125 ML/HR: 10 INJECTION, SOLUTION INTRAMUSCULAR; INTRAVENOUS at 18:00

## 2025-01-31 RX ADMIN — OXYTOCIN 125 ML/HR: 10 INJECTION, SOLUTION INTRAMUSCULAR; INTRAVENOUS at 15:45

## 2025-01-31 RX ADMIN — IBUPROFEN 800 MG: 800 TABLET, FILM COATED ORAL at 17:37

## 2025-01-31 ASSESSMENT — SOCIAL DETERMINANTS OF HEALTH (SDOH)
WITHIN THE LAST YEAR, HAVE TO BEEN RAPED OR FORCED TO HAVE ANY KIND OF SEXUAL ACTIVITY BY YOUR PARTNER OR EX-PARTNER?: NO
WITHIN THE PAST 12 MONTHS, YOU WORRIED THAT YOUR FOOD WOULD RUN OUT BEFORE YOU GOT THE MONEY TO BUY MORE: NEVER TRUE
WITHIN THE LAST YEAR, HAVE YOU BEEN KICKED, HIT, SLAPPED, OR OTHERWISE PHYSICALLY HURT BY YOUR PARTNER OR EX-PARTNER?: NO
WITHIN THE LAST YEAR, HAVE YOU BEEN HUMILIATED OR EMOTIONALLY ABUSED IN OTHER WAYS BY YOUR PARTNER OR EX-PARTNER?: NO
WITHIN THE PAST 12 MONTHS, THE FOOD YOU BOUGHT JUST DIDN'T LAST AND YOU DIDN'T HAVE MONEY TO GET MORE: NEVER TRUE
WITHIN THE LAST YEAR, HAVE YOU BEEN KICKED, HIT, SLAPPED, OR OTHERWISE PHYSICALLY HURT BY YOUR PARTNER OR EX-PARTNER?: NO
IN THE PAST 12 MONTHS, HAS THE ELECTRIC, GAS, OIL, OR WATER COMPANY THREATENED TO SHUT OFF SERVICE IN YOUR HOME?: NO
IN THE PAST 12 MONTHS, HAS THE ELECTRIC, GAS, OIL, OR WATER COMPANY THREATENED TO SHUT OFF SERVICE IN YOUR HOME?: NO
WITHIN THE LAST YEAR, HAVE TO BEEN RAPED OR FORCED TO HAVE ANY KIND OF SEXUAL ACTIVITY BY YOUR PARTNER OR EX-PARTNER?: NO
WITHIN THE PAST 12 MONTHS, THE FOOD YOU BOUGHT JUST DIDN'T LAST AND YOU DIDN'T HAVE MONEY TO GET MORE: NEVER TRUE
WITHIN THE LAST YEAR, HAVE YOU BEEN HUMILIATED OR EMOTIONALLY ABUSED IN OTHER WAYS BY YOUR PARTNER OR EX-PARTNER?: NO
WITHIN THE LAST YEAR, HAVE YOU BEEN AFRAID OF YOUR PARTNER OR EX-PARTNER?: NO
WITHIN THE LAST YEAR, HAVE YOU BEEN AFRAID OF YOUR PARTNER OR EX-PARTNER?: NO
WITHIN THE PAST 12 MONTHS, YOU WORRIED THAT YOUR FOOD WOULD RUN OUT BEFORE YOU GOT THE MONEY TO BUY MORE: NEVER TRUE

## 2025-01-31 ASSESSMENT — LIFESTYLE VARIABLES
HAVE YOU EVER FELT YOU SHOULD CUT DOWN ON YOUR DRINKING: NO
HOW MANY TIMES IN THE PAST YEAR HAVE YOU HAD 5 OR MORE DRINKS IN A DAY: 0
TOTAL SCORE: 0
DOES PATIENT WANT TO STOP DRINKING: NO
CONSUMPTION TOTAL: NEGATIVE
EVER FELT BAD OR GUILTY ABOUT YOUR DRINKING: NO
AVERAGE NUMBER OF DAYS PER WEEK YOU HAVE A DRINK CONTAINING ALCOHOL: 0
ON A TYPICAL DAY WHEN YOU DRINK ALCOHOL HOW MANY DRINKS DO YOU HAVE: 0
TOTAL SCORE: 0
TOTAL SCORE: 0
EVER HAD A DRINK FIRST THING IN THE MORNING TO STEADY YOUR NERVES TO GET RID OF A HANGOVER: NO
HAVE PEOPLE ANNOYED YOU BY CRITICIZING YOUR DRINKING: NO
ALCOHOL_USE: NO

## 2025-01-31 ASSESSMENT — PAIN DESCRIPTION - PAIN TYPE
TYPE: ACUTE PAIN

## 2025-01-31 ASSESSMENT — PATIENT HEALTH QUESTIONNAIRE - PHQ9
1. LITTLE INTEREST OR PLEASURE IN DOING THINGS: NOT AT ALL
SUM OF ALL RESPONSES TO PHQ9 QUESTIONS 1 AND 2: 0
2. FEELING DOWN, DEPRESSED, IRRITABLE, OR HOPELESS: NOT AT ALL

## 2025-01-31 ASSESSMENT — FIBROSIS 4 INDEX
FIB4 SCORE: 0.43
FIB4 SCORE: 0.43

## 2025-01-31 NOTE — PROGRESS NOTES
35yo, , edc2/6, pt presents with c/o contractions every 3-5 minutes beginning at 0100 this morning. Pt denies LOF, vag bleeding. POS fm. EFM and [place] placed. VSS.    SVE see flowsheets  Update to Dr. Pressley.  SVE Recheck.  Orders to ambulate.  SVE Recheck  Update to Dr. Pressley.  Discharge orders received.  Labor precautions and discharge instructions given. Patient states understanding. Patient discharged in stable condition, ambulatory, with FOB.

## 2025-01-31 NOTE — PROGRESS NOTES
34 y.o.  EDC 25 EGA 39.1 GBS Negative  Pt returns to LDA2 with FOB Joon c/o increased contractions. Pt wsa evaluated here last night as an OB check and was noted to be 2-3/90/-2 @ 0130.. Pt reports positive fetal movement, denies vaginal bleeding. VSS   0930 SVE 3/90/-2. Report to Dr. Centeno.   Pt up to walk after reactive NST obtained.   1130 Repeat SVE 1. Pt given option to stay and have augmentation of labor, to stay for additional time with repeat SVE, or to be discharged to home, with return to L&D for increased contractions, decreased fetal movement, LOF, or vaginal bleeding. Pt opted to go home and return with labor progression. Discharge order received by Dr. Centeno. Term labor precautions given. 1130 Pt discharged to home, ambulatory with FOB.

## 2025-02-01 VITALS
RESPIRATION RATE: 18 BRPM | BODY MASS INDEX: 29.62 KG/M2 | SYSTOLIC BLOOD PRESSURE: 125 MMHG | TEMPERATURE: 97.9 F | WEIGHT: 200 LBS | OXYGEN SATURATION: 96 % | HEART RATE: 70 BPM | HEIGHT: 69 IN | DIASTOLIC BLOOD PRESSURE: 86 MMHG

## 2025-02-01 LAB
ERYTHROCYTE [DISTWIDTH] IN BLOOD BY AUTOMATED COUNT: 45 FL (ref 35.9–50)
HCT VFR BLD AUTO: 28 % (ref 37–47)
HGB BLD-MCNC: 9 G/DL (ref 12–16)
MCH RBC QN AUTO: 25.4 PG (ref 27–33)
MCHC RBC AUTO-ENTMCNC: 32.1 G/DL (ref 32.2–35.5)
MCV RBC AUTO: 78.9 FL (ref 81.4–97.8)
PLATELET # BLD AUTO: 241 K/UL (ref 164–446)
PMV BLD AUTO: 12.8 FL (ref 9–12.9)
RBC # BLD AUTO: 3.55 M/UL (ref 4.2–5.4)
WBC # BLD AUTO: 18.7 K/UL (ref 4.8–10.8)

## 2025-02-01 PROCEDURE — A9270 NON-COVERED ITEM OR SERVICE: HCPCS | Performed by: OBSTETRICS & GYNECOLOGY

## 2025-02-01 PROCEDURE — 36415 COLL VENOUS BLD VENIPUNCTURE: CPT

## 2025-02-01 PROCEDURE — 700102 HCHG RX REV CODE 250 W/ 637 OVERRIDE(OP): Performed by: OBSTETRICS & GYNECOLOGY

## 2025-02-01 PROCEDURE — 85027 COMPLETE CBC AUTOMATED: CPT

## 2025-02-01 PROCEDURE — 302449 STATCHG TRIAGE ONLY (STATISTIC)

## 2025-02-01 RX ADMIN — PRENATAL WITH FERROUS FUM AND FOLIC ACID 1 TABLET: 3080; 920; 120; 400; 22; 1.84; 3; 20; 10; 1; 12; 200; 27; 25; 2 TABLET ORAL at 08:30

## 2025-02-01 RX ADMIN — IBUPROFEN 800 MG: 800 TABLET, FILM COATED ORAL at 10:42

## 2025-02-01 RX ADMIN — IBUPROFEN 800 MG: 800 TABLET, FILM COATED ORAL at 02:14

## 2025-02-01 RX ADMIN — LEVOTHYROXINE SODIUM 150 MCG: 0.15 TABLET ORAL at 05:36

## 2025-02-01 RX ADMIN — ACETAMINOPHEN 1000 MG: 500 TABLET ORAL at 05:37

## 2025-02-01 RX ADMIN — ACETAMINOPHEN 1000 MG: 500 TABLET ORAL at 12:57

## 2025-02-01 ASSESSMENT — PAIN DESCRIPTION - PAIN TYPE
TYPE: ACUTE PAIN

## 2025-02-01 ASSESSMENT — EDINBURGH POSTNATAL DEPRESSION SCALE (EPDS)
I HAVE LOOKED FORWARD WITH ENJOYMENT TO THINGS: AS MUCH AS I EVER DID
THINGS HAVE BEEN GETTING ON TOP OF ME: NO, I HAVE BEEN COPING AS WELL AS EVER
I HAVE FELT SAD OR MISERABLE: NO, NOT AT ALL
I HAVE BEEN ANXIOUS OR WORRIED FOR NO GOOD REASON: NO, NOT AT ALL
I HAVE BLAMED MYSELF UNNECESSARILY WHEN THINGS WENT WRONG: NOT VERY OFTEN
I HAVE BEEN SO UNHAPPY THAT I HAVE HAD DIFFICULTY SLEEPING: NOT AT ALL
I HAVE FELT SCARED OR PANICKY FOR NO GOOD REASON: NO, NOT AT ALL
I HAVE BEEN ABLE TO LAUGH AND SEE THE FUNNY SIDE OF THINGS: AS MUCH AS I ALWAYS COULD
I HAVE BEEN SO UNHAPPY THAT I HAVE BEEN CRYING: NO, NEVER
THE THOUGHT OF HARMING MYSELF HAS OCCURRED TO ME: NEVER

## 2025-02-01 NOTE — PROGRESS NOTES
1405 Pt returned to L&D c/o increased UC's. XENA Dupree at bedside. Expecting a baby boy Delvin. 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     1410 EFM & [place] applied. SVE 5-6//-1  Pt desires admission and considering epidural  1500 SROM clear SVE 8//+1.    1530 SVE complete. Dr. Centeno called to bedside.   Pt pushing  1547  by Dr. Centeno of viable male infant. . Spontaneous delivery of intact placenta. Second degree laceration with repair.   8 & 9 apgars. Weight pending.

## 2025-02-01 NOTE — CARE PLAN
The patient is Stable - Low risk of patient condition declining or worsening         Progress made toward(s) clinical / shift goals: EFM & [place] in place to monitor fetal heart tones. Pt desires un-medicated vaginal delivery with minimal interventions.   SROM clear,  without epidural.   Patient is not progressing towards the following goals:

## 2025-02-01 NOTE — CONSULTS
DATE OF SERVICE:  2025     HISTORY OF PRESENT ILLNESS:  The patient is a 34-year-old  at 39 and   1/7th weeks' gestation, who presents back to labor and delivery after being   discharged home a few hours ago with complaints of increasing contractions.    She was 3-4 cm when she left labor and delivery a few hours ago.  She has no   leaking fluid, no vaginal bleeding.  The baby is moving well.  She is   semaj every 2-5 minutes.     PHYSICAL EXAMINATION:  VITAL SIGNS:  Stable.  She is afebrile.  PELVIC:  Sterile vaginal exam, she was checked and found to be the same as   when she left labor and delivery earlier 3-4 cm dilated.  Fetal tracing is   category 1, reactive, moderate variability, no decelerations.     ASSESSMENT AND PLAN:  A 34-year-old  at 39 and 1/7th weeks' gestation.  1.  Early latent labor.  The patient is 3-4 cm and she was 3-4 cm several   hours ago.  She returns and has no further cervical dilation.  I offered her   to let her just go home outright since she has a category 1 tracing and does   not want any augmentation or intervention of any sort versus ambulating for an   hour and being rechecked which she chose to do, so she ambulated for about an   hour and was rechecked and again found to be the same.  She was then   discharged home.  Kick counts and labor precautions given.  Suspect to see her   later in the day if not tomorrow.  All questions answered.        ______________________________  MD CAREN KING/REBEKAH    DD:  2025 17:39  DT:  2025 20:21    Job#:  [id number]

## 2025-02-01 NOTE — PROGRESS NOTES
Report received from AM RN at 1900. Patient sitting up in bed reporting pain. Fundus firm one fingerbreadth below umbilicus, lochia light. Pain 4/10. POC discussed with patient and SO at bedside. Call light within reach.

## 2025-02-01 NOTE — H&P
DATE OF ADMISSION:  2025     HISTORY OF PRESENT ILLNESS:  A 34-year-old , who presents to labor and   delivery for the third time in the last 24 hours at 39 and 1/7th weeks'   gestation with an EDC of 2025 with complaints of increasing severe   contractions.  Again, the patient had been here twice earlier throughout this   last 24 hours, but she did not want to do any augmentation and wanted to do   everything natural and she was in early latent labor the other 2 visits;   however, this time she did arrive in active labor, 5 cm and wanted to be   admitted.  She was even entertaining possibly wanting to proceed with an   epidural.  She was admitted and she had spontaneous rupture of membranes and   went quickly to then complete.     She has had uneventful otherwise pregnancy.  She had an elevated 1-hour   glucose tolerance test, but a normal 3-hour.  She had cell-free DNA testing   that was low risk.  She had an anterior placenta circumvallate placenta.  She   does have a history of hypothyroidism and is on levothyroxine.  Group B strep   is negative.     PAST MEDICAL HISTORY:  Significant for Hashimoto's thyroiditis and migraine   without aura.     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  She is .  No   alcohol, tobacco or history of drug use.  Her 's name is Joon.     FAMILY HISTORY:  Includes Hodgkin's lymphoma and non-Hodgkin's lymphoma;   breast cancer; melanoma; ovarian cancer; pancreatic, colon and uterine cancer.     PAST SURGICAL HISTORY:  Includes appendectomy and tonsillectomy.     OBSTETRICAL HISTORY:  G1, P0.     GYNECOLOGIC HISTORY:  Last menstrual period 3/11/2024.     CURRENT MEDICATIONS:  Prenatal vitamins and levothyroxine.     PHYSICAL EXAMINATION:  VITAL SIGNS:  On arrival, blood pressure 123/80, pulse 83, respirations 18-20   and weight 200 pounds.  GENERAL:  Awake, alert, oriented.  She is actively laboring and writhing in   pain.  CARDIOVASCULAR:   Regular rate and rhythm.  CHEST:  Clear to auscultation bilaterally.  ABDOMEN:  Gravid, nontender, nondistended.  Normal bowel sounds.  EXTREMITIES:  No cyanosis, clubbing or edema.  PELVIC:  Sterile vaginal exam, again she was 5 cm dilated upon arrival.  She   subsequently had spontaneous rupture of membranes, clear fluid while being   admitted.  Fetal heart rate tracing category 1, reactive, moderate   variability, no decelerations and semaj about every 2 minutes.     LABORATORY DATA:  She is O positive, antibody screen negative, rubella is   immune, RPR nonreactive, hepatitis B surface antigen negative, hepatitis C   antibody negative, group B strep negative, 1-hour Glucola was elevated at 142,   but a normal 3-hour.     ASSESSMENT AND PLAN:  A 34-year-old  at 39 and 1/7th weeks' gestation.  1.  Admit for active labor.  Anticipate spontaneous vaginal delivery.  Fetal   tracing category 1, reassuring and group B strep is negative.  She may have   epidural anesthesia if she desires.     We will anticipate spontaneous vaginal delivery.        ______________________________  MD CAREN KING/LISA    DD:  2025 17:36  DT:  2025 19:55    Job#:  [id number]

## 2025-02-01 NOTE — CARE PLAN
The patient is Stable - Low risk of patient condition declining or worsening    Shift Goals  Clinical Goals: Pain mgmt    Progress made toward(s) clinical / shift goals:    Problem: Knowledge Deficit - Standard  Goal: Patient and family/care givers will demonstrate understanding of plan of care, disease process/condition, diagnostic tests and medications  Outcome: Progressing     Problem: Pain - Standard  Goal: Alleviation of pain or a reduction in pain to the patient’s comfort goal  Outcome: Progressing       Patient is not progressing towards the following goals:

## 2025-02-01 NOTE — L&D DELIVERY NOTE
See full dictation      Unmedicated  Male, vtx  Compound right hand presentation  Apgars 8/9  Weight pending  Intact placenta , 3vc  EBL 400cc  2nd degree midline vaginal and perineal laceration/right labial laceration and small left labial excoriation.  Repair with local lidocaine and vicryl  Rectum/sphincter intact  No complications.

## 2025-02-01 NOTE — L&D DELIVERY NOTE
DATE OF SERVICE:  2025     PREOPERATIVE DIAGNOSES:  1.  Intrauterine pregnancy at 39 and 1/7th weeks' gestation.  2.  Active labor.     POSTOPERATIVE DIAGNOSES:  1.  Intrauterine pregnancy at 39 and 1/7th weeks' gestation.  2.  Active labor.     PROCEDURE:  Normal spontaneous vaginal delivery.     DELIVERING PHYSICIAN:  Charlene Centeno MD     ANESTHESIA:  Local lidocaine for repair only.     COMPLICATIONS:  None.     ESTIMATED BLOOD LOSS:  400 mL.     FINDINGS:  Male infant, cephalic presentation with a compound right hand   presentation on the face, Apgars 8 and 9, weight still pending at the time of   this dictation.  Intact placenta, 3-vessel cord, second-degree midline vaginal   and perineal laceration, right labial laceration and a small left labial   excoriation repaired with local lidocaine and Vicryl.  Rectum and sphincter   intact.  No complications.  Mother and baby both doing well.  Mother will go   to postpartum, baby to  nursery.     HOSPITAL COURSE:  The patient is a 34-year-old  at 39 and 1/7th weeks'   gestation.  Group B strep is negative.  Had been to labor and delivery for the   third time in the last 24 hours complaining of contractions; however, did not   want to have any interventions or augmentation, so twice she was sent home   and walked in, came back.  When she returned back, she was 5 cm dilated and   very active.  She was transported to a labor room and as soon as her IV was   started, she had spontaneous rupture of membranes, clear fluid.  She went   fairly quickly from 5 cm to complete.  She pushed only about 15 minutes and   delivered a male infant over a second-degree midline perineal and vaginal   laceration with a compound hand presentation.  Once the anterior shoulder   delivered, the rest of baby delivered uneventfully.  Baby was placed on   maternal abdomen with awaiting delivery team nurse.  Nose and mouth were bulb   suctioned.  Delay for cord clamp 40  seconds.  Cord was then clamped x2 and cut   by father of the baby.  Placenta delivered spontaneously intact, 3-vessel   cord.  Second-degree laceration was repaired with 2-0 and 3-0 Vicryl.  Rectum   and sphincter intact.  Local lidocaine was infiltrated prior to the repair.    The right labial laceration was infiltrated with local lidocaine as well and 2   interrupted sutures of 3-0 Vicryl were placed for hemostasis.  She had a   small left labial excoriation that did not require repair.  Sponge, laps and   needle counts were correct.  Again, mother and baby both doing well, will go   to postpartum, and  nursery respectively.        ______________________________  MD CAREN KING/REBEKAH    DD:  2025 16:38  DT:  2025 19:54    Job#:  [id number]

## 2025-02-01 NOTE — PROGRESS NOTES
Patient arrive to S326 via wheelchair with L&D RN. Report received from April, LIA. Assessment complete. Denies pain at this time. Patient oriented to room, call light, infant security, emergency light, and unit routine. Encourage to call for assistance. Visiting policy discussed.

## 2025-02-01 NOTE — DISCHARGE INSTRUCTIONS
PATIENT DISCHARGE EDUCATION INSTRUCTION SHEET    REASONS TO CALL YOUR OBSTETRICIAN  Persistent fever, shaking, chills (Temperature higher than 100.4) may indicate you have an infection  Heavy bleeding: soaking more than 1 pad per hour; Passing clots an egg-sized clot or bigger may mean you have an postpartum hemorrhage  Foul odor from vagina or bad smelling or discolored discharge or blood  Breast infection (Mastitis symptoms); breast pain, chills, fever, redness or red streaks, may feel flu like symptoms  Urinary pain, burning or frequency  Incision that is not healing, increased redness, swelling, tenderness or pain, or any pus from episiotomy or  site may mean you have an infection  Redness, swelling, warmth, or painful to touch in the calf area of your leg may mean you have a blood clot  Severe or intensified depression, thoughts or feelings of wanting to hurt yourself or someone else   Pain in chest, obstructed breathing or shortness of breath (trouble catching your breath) may mean you are having a postpartum complication. Call your provider immediately   Headache that does not get better, even after taking medicine, a bad headache with vision changes or pain in the upper right area of your belly may mean you have high blood pressure or post birth preeclampsia. Call your provider immediately    HAND WASHING  All family and friends should wash their hands:  Before and after holding the baby  Before feeding the baby  After using the restroom or changing the baby's diaper    WOUND CARE  Ask your physician for additional care instructions. In general:  Episiotomy/Laceration  May use chuck-spray bottle, witch hazel pads and dermaplast spray for comfort  Use chuck-spray bottle after urinating to cleanse perineal area  To prevent burning during urination spray chuck-water bottle on labial area   Pat perineal area dry until episiotomy/laceration is healed  Continue to use chuck-bottle until bleeding stops as  needed  If have a 2nd degree laceration or greater, a Sitz bath can offer relief from soreness, burning, and inflammation   Sitz Bath   Sit in 6 inches of warm water and soak laceration as needed until the laceration heals    VAGINAL CARE AND BLEEDING  Nothing inside vagina for 6 weeks:   No sexual intercourse, tampons or douching  Bleeding may continue for 2-4 weeks. Amount and color may vary  Soaking 1 pad or more in an hour for several hours is considered heavy bleeding  Passing large egg sized blood clots can be concerning  If you feel like you have heavy bleeding or are having increasing amount of blood clots call your Obstetrician immediately  If you begin feeling faint upon standing, feeling sick to your stomach, have clammy skin, a really fast heartbeat, have chills, start feeling confused, dizzy, sleepy or weak, or feeling like you're going to faint call your Obstetrician immediately    HYPERTENSION   Preeclampsia or gestational hypertension are types of high blood pressure that only pregnant women can get. It is important for you to be aware of symptoms to seek early intervention and treatment. If you have any of these symptoms immediately call your Obstetrician    Vision changes or blurred vision   Severe headache or pain that is unrelieved with medication and will not go away  Persistent pain in upper abdomen or shoulder   Increased swelling of face, feet, or hands  Difficulty breathing or shortness of breath at rest  Urinating less than usual    URINATION AND BOWEL MOVEMENTS  Eating more fiber (bran cereal, fruits, and vegetables) and drinking plenty of fluids will help to avoid constipation  Urinary frequency and urgency after childbirth is normal  If you experience any urinary pain, burning or frequency call your provider    BIRTH CONTROL  It is possible to become pregnant at any time after delivery and while breastfeeding  Plan to discuss a method of birth control with your physician at your post  "delivery follow up visit    POSTPARTUM BLUES  During the first few days after birth, you may experience a sense of the \"blues\" which may include impatience, irritability or even crying. These feelings come and go quickly. However, as many as 1 in 10 women experience emotional symptoms known as postpartum depression.     POSTPARTUM DEPRESSION    May start as early as the second or third day after delivery or take several weeks or months to develop. Symptoms of \"blues\" are present, but are more intense: Crying spells; loss of appetite; feelings of hopelessness or loss of control; fear of touching the baby; over concern or no concern at all about the baby; little or no concern about your own appearance/caring for yourself; and/or inability to sleep or excessive sleeping. Contact your Obstetrician if you are experiencing any of these symptoms     PREVENTING SHAKEN BABY  If you are angry or stressed, PUT THE BABY IN THE CRIB, step away, take some deep breaths, and wait until you are calm to care for the baby. DO NOT SHAKE THE BABY. You are not alone, call a supporter for help.  [facility] 24/7 crisis call line (097-119-8407) or (1-224.125.1202)  You can also text them, text \"ANSWER\" (307885)  "

## 2025-02-01 NOTE — DISCHARGE SUMMARY
Discharge Summary:      Amanda Millan    Admit Date:   2025  Discharge Date:  2025     Admitting diagnosis:  Labor and delivery indication for care or intervention [O75.9]  Discharge Diagnosis: Status post vaginal, spontaneous.  Pregnancy Complications: none          History:  No past medical history on file.  OB History    Para Term  AB Living   1             SAB IAB Ectopic Molar Multiple Live Births                    # Outcome Date GA Lbr Roc/2nd Weight Sex Type Anes PTL Lv   1 Current                 Hyampom oil  Patient Active Problem List    Diagnosis Date Noted    Labor and delivery indication for care or intervention 2025    Labor and delivery, indication for care 2024        Hospital Course:   34 y.o. , now para 1, was admitted with the above mentioned diagnosis, underwent Active Labor, vaginal, spontaneous. Patient postpartum course was unremarkable, with progressive advancement in diet , ambulation and toleration of oral analgesia. Patient without complaints today and desires discharge.      Vitals:    25 1815 25 2200 25 0200 25 0600   BP: 136/78 112/70 122/82 125/86   Pulse: 80 91 91 70   Resp: 18 18 18 18   Temp: 37 °C (98.6 °F) 36.9 °C (98.5 °F) 36.6 °C (97.9 °F) 36.6 °C (97.9 °F)   TempSrc: Temporal Temporal Temporal Temporal   SpO2: 97% 96% 97% 96%   Weight:       Height:           Current Facility-Administered Medications   Medication Dose    LR infusion      oxytocin (Pitocin) infusion (for post delivery)  125 mL/hr    lactated ringers infusion  2,000 mL    docusate sodium (Colace) capsule 100 mg  100 mg    ibuprofen (Motrin) tablet 800 mg  800 mg    acetaminophen (Tylenol) tablet 1,000 mg  1,000 mg    measles, mumps and rubella vaccine (Mmr) injection 0.5 mL  0.5 mL    PRN oxytocin (PITOCIN) (20 Units/1000 mL) PRN for excessive uterine bleeding - See Admin Instr  125-999 mL/hr    miSOPROStol (Cytotec) tablet 800 mcg  800 mcg     oxyCODONE immediate-release (Roxicodone) tablet 5 mg  5 mg    prenatal plus vitamin (Stuartnatal 1+1) 27-1 MG tablet 1 Tablet  1 Tablet    simethicone (Mylicon) chewable tablet 125 mg  125 mg    calcium carbonate (Tums) chewable tab 1,000 mg  1,000 mg    levothyroxine (Synthroid) tablet 150 mcg  150 mcg       Exam: see progress note for todays exam     Labs:  Recent Labs     01/31/25  1430 02/01/25  0008   WBC 14.9* 18.7*   RBC 4.51 3.55*   HEMOGLOBIN 11.4* 9.0*   HEMATOCRIT 35.6* 28.0*   MCV 78.9* 78.9*   MCH 25.3* 25.4*   MCHC 32.0* 32.1*   RDW 44.1 45.0   PLATELETCT 289 241   MPV 12.7 12.8        Activity:   Discharge to home  Pelvic Rest x 6 weeks    Assessment: postpartum day 1    Discharge Assessment:discharge home with baby     Follow up: .Dr Pressley 6 weeks     Discharge Meds:   No current outpatient medications on file.   May take over the counter tylenol, motrin and would like her to continue prenatal vitamin.     Charlene Centeno M.D.

## 2025-02-01 NOTE — LACTATION NOTE
This note was copied from a baby's chart.  Mom is a 35 y/o P1 who delivered baby boy weighing 7# 2.1 oz at 39.2 wks. 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   Mom reports baby has been feeding well and just not long before visit. Baby will be circumcised later to day and parents are hoping for a late evening discharge. LC discussed keeping baby STS and observing for early feeding cues.    LC asked mom to call for next feeding so LC can observe before discharge.  LC reviewed basic breast feeding education. Mom has a personal pump and [facility] resources provided     POC   -Feed baby with feeding cues and at least a minimum of 8x/24 hours.  Expect cluster feeding as this is normal during early days of life   - It is not recommended to let baby sleep longer than 3 hours between feedings and if sleepy, place skin to skin to promote feeding interest and milk production.    Baby's usually feed more frequently and longer when skin to skin with mother.    - Make sure infant is getting enough by ensuring frequent feedings as well as looking for transitioning stools from dark meconium to bright yellow/green seedy loose stools by day 5.   - Follow up with PEDS PCP as scheduled for weight checks and to make sure feeding is progressing appropriately.    Call for breastfeeding support through [facility] ([facility]) as needed and attend the [facility] without need for appointment.    Parents have no questions or concerns and verbally acknowledge.

## 2025-02-01 NOTE — PROGRESS NOTES
Progress Note    Amanda Millan   PP day 1  Chief Admitting Dx: Labor and delivery indication for care or intervention [O75.9]  Delivery Type: vaginal, spontaneous      Subjective:  Ambulating: voiding, tolerating diet, normal lochia. Pain controlled.     Vitals:    01/31/25 1815 01/31/25 2200 02/01/25 0200 02/01/25 0600   BP: 136/78 112/70 122/82 125/86   Pulse: 80 91 91 70   Resp: 18 18 18 18   Temp: 37 °C (98.6 °F) 36.9 °C (98.5 °F) 36.6 °C (97.9 °F) 36.6 °C (97.9 °F)   TempSrc: Temporal Temporal Temporal Temporal   SpO2: 97% 96% 97% 96%   Weight:       Height:           Exam:  Gen: no acute distress  Abdomen: soft nt/nd firm fundus  Ext: no calf pain dwayne LE    Labs:   Recent Results (from the past 24 hours)   Hold Blood Bank Specimen (Not Tested)    Collection Time: 01/31/25  2:30 PM   Result Value Ref Range    Holding Tube - Bb DONE    CBC with differential    Collection Time: 01/31/25  2:30 PM   Result Value Ref Range    WBC 14.9 (H) 4.8 - 10.8 K/uL    RBC 4.51 4.20 - 5.40 M/uL    Hemoglobin 11.4 (L) 12.0 - 16.0 g/dL    Hematocrit 35.6 (L) 37.0 - 47.0 %    MCV 78.9 (L) 81.4 - 97.8 fL    MCH 25.3 (L) 27.0 - 33.0 pg    MCHC 32.0 (L) 32.2 - 35.5 g/dL    RDW 44.1 35.9 - 50.0 fL    Platelet Count 289 164 - 446 K/uL    MPV 12.7 9.0 - 12.9 fL    Neutrophils-Polys 83.10 (H) 44.00 - 72.00 %    Lymphocytes 9.20 (L) 22.00 - 41.00 %    Monocytes 5.70 0.00 - 13.40 %    Eosinophils 0.70 0.00 - 6.90 %    Basophils 0.20 0.00 - 1.80 %    Immature Granulocytes 1.10 (H) 0.00 - 0.90 %    Nucleated RBC 0.00 0.00 - 0.20 /100 WBC    Neutrophils (Absolute) 12.35 (H) 1.82 - 7.42 K/uL    Lymphs (Absolute) 1.36 1.00 - 4.80 K/uL    Monos (Absolute) 0.85 0.00 - 0.85 K/uL    Eos (Absolute) 0.10 0.00 - 0.51 K/uL    Baso (Absolute) 0.03 0.00 - 0.12 K/uL    Immature Granulocytes (abs) 0.16 (H) 0.00 - 0.11 K/uL    NRBC (Absolute) 0.00 K/uL   T.PALLIDUM AB JANE (Syphilis)    Collection Time: 01/31/25  2:30 PM   Result Value Ref Range     Syphilis, Treponemal Qual Non-Reactive Non-Reactive   CBC without differential- Once in 8 hours post delivery    Collection Time: 25 12:08 AM   Result Value Ref Range    WBC 18.7 (H) 4.8 - 10.8 K/uL    RBC 3.55 (L) 4.20 - 5.40 M/uL    Hemoglobin 9.0 (L) 12.0 - 16.0 g/dL    Hematocrit 28.0 (L) 37.0 - 47.0 %    MCV 78.9 (L) 81.4 - 97.8 fL    MCH 25.4 (L) 27.0 - 33.0 pg    MCHC 32.1 (L) 32.2 - 35.5 g/dL    RDW 45.0 35.9 - 50.0 fL    Platelet Count 241 164 - 446 K/uL    MPV 12.8 9.0 - 12.9 fL       Assessment:  Ppd 1 s/p     Plan:  Routine pp care  Anticipate dc home today    Charlene Centeno M.D.

## 2025-02-01 NOTE — CARE PLAN
The patient is Stable - Low risk of patient condition declining or worsening    Shift Goals  Clinical Goals: VSS, lochia WDL, pain WDL    Progress made toward(s) clinical / shift goals:  VSS, lochia scant, pain controlled with PRN medications and rest. Patient stable and ready for discharge home with infant and family      Problem: Knowledge Deficit - Postpartum  Goal: Patient will verbalize and demonstrate understanding of self and infant care  Outcome: Progressing     Problem: Altered Physiologic Condition  Goal: Patient physiologically stable as evidenced by normal lochia, palpable uterine involution and vitals within normal limits  Outcome: Progressing

## 2025-07-04 ENCOUNTER — APPOINTMENT (OUTPATIENT)
Dept: RADIOLOGY | Facility: MEDICAL CENTER | Age: 35
End: 2025-07-04
Attending: EMERGENCY MEDICINE
Payer: COMMERCIAL

## 2025-07-04 ENCOUNTER — HOSPITAL ENCOUNTER (EMERGENCY)
Facility: MEDICAL CENTER | Age: 35
End: 2025-07-04
Attending: EMERGENCY MEDICINE
Payer: COMMERCIAL

## 2025-07-04 VITALS
SYSTOLIC BLOOD PRESSURE: 121 MMHG | DIASTOLIC BLOOD PRESSURE: 85 MMHG | BODY MASS INDEX: 23.7 KG/M2 | RESPIRATION RATE: 18 BRPM | OXYGEN SATURATION: 98 % | WEIGHT: 160 LBS | TEMPERATURE: 98.5 F | HEIGHT: 69 IN | HEART RATE: 71 BPM

## 2025-07-04 DIAGNOSIS — N64.4 BREAST PAIN: ICD-10-CM

## 2025-07-04 DIAGNOSIS — N61.0 ACUTE MASTITIS OF RIGHT BREAST: Primary | ICD-10-CM

## 2025-07-04 DIAGNOSIS — R50.81 FEVER IN OTHER DISEASES: ICD-10-CM

## 2025-07-04 LAB
ALBUMIN SERPL BCP-MCNC: 3.8 G/DL (ref 3.2–4.9)
ALBUMIN/GLOB SERPL: 1.1 G/DL
ALP SERPL-CCNC: 142 U/L (ref 30–99)
ALT SERPL-CCNC: 72 U/L (ref 2–50)
ANION GAP SERPL CALC-SCNC: 12 MMOL/L (ref 7–16)
AST SERPL-CCNC: 54 U/L (ref 12–45)
BASOPHILS # BLD AUTO: 0.7 % (ref 0–1.8)
BASOPHILS # BLD: 0.04 K/UL (ref 0–0.12)
BILIRUB SERPL-MCNC: <0.2 MG/DL (ref 0.1–1.5)
BUN SERPL-MCNC: 14 MG/DL (ref 8–22)
CALCIUM ALBUM COR SERPL-MCNC: 9 MG/DL (ref 8.5–10.5)
CALCIUM SERPL-MCNC: 8.8 MG/DL (ref 8.5–10.5)
CHLORIDE SERPL-SCNC: 107 MMOL/L (ref 96–112)
CO2 SERPL-SCNC: 21 MMOL/L (ref 20–33)
CREAT SERPL-MCNC: 0.73 MG/DL (ref 0.5–1.4)
EOSINOPHIL # BLD AUTO: 0.35 K/UL (ref 0–0.51)
EOSINOPHIL NFR BLD: 5.9 % (ref 0–6.9)
ERYTHROCYTE [DISTWIDTH] IN BLOOD BY AUTOMATED COUNT: 46.3 FL (ref 35.9–50)
GFR SERPLBLD CREATININE-BSD FMLA CKD-EPI: 110 ML/MIN/1.73 M 2
GLOBULIN SER CALC-MCNC: 3.6 G/DL (ref 1.9–3.5)
GLUCOSE SERPL-MCNC: 82 MG/DL (ref 65–99)
HCT VFR BLD AUTO: 39.8 % (ref 37–47)
HGB BLD-MCNC: 13.1 G/DL (ref 12–16)
IMM GRANULOCYTES # BLD AUTO: 0.02 K/UL (ref 0–0.11)
IMM GRANULOCYTES NFR BLD AUTO: 0.3 % (ref 0–0.9)
LYMPHOCYTES # BLD AUTO: 1.97 K/UL (ref 1–4.8)
LYMPHOCYTES NFR BLD: 33.1 % (ref 22–41)
MCH RBC QN AUTO: 26.9 PG (ref 27–33)
MCHC RBC AUTO-ENTMCNC: 32.9 G/DL (ref 32.2–35.5)
MCV RBC AUTO: 81.7 FL (ref 81.4–97.8)
MONOCYTES # BLD AUTO: 0.73 K/UL (ref 0–0.85)
MONOCYTES NFR BLD AUTO: 12.2 % (ref 0–13.4)
NEUTROPHILS # BLD AUTO: 2.85 K/UL (ref 1.82–7.42)
NEUTROPHILS NFR BLD: 47.8 % (ref 44–72)
NRBC # BLD AUTO: 0 K/UL
NRBC BLD-RTO: 0 /100 WBC (ref 0–0.2)
PLATELET # BLD AUTO: 305 K/UL (ref 164–446)
PMV BLD AUTO: 10.9 FL (ref 9–12.9)
POTASSIUM SERPL-SCNC: 3.5 MMOL/L (ref 3.6–5.5)
PROT SERPL-MCNC: 7.4 G/DL (ref 6–8.2)
RBC # BLD AUTO: 4.87 M/UL (ref 4.2–5.4)
SODIUM SERPL-SCNC: 140 MMOL/L (ref 135–145)
WBC # BLD AUTO: 6 K/UL (ref 4.8–10.8)

## 2025-07-04 PROCEDURE — 99284 EMERGENCY DEPT VISIT MOD MDM: CPT

## 2025-07-04 PROCEDURE — 87040 BLOOD CULTURE FOR BACTERIA: CPT | Mod: 91

## 2025-07-04 PROCEDURE — 76604 US EXAM CHEST: CPT

## 2025-07-04 PROCEDURE — 96365 THER/PROPH/DIAG IV INF INIT: CPT

## 2025-07-04 PROCEDURE — 80053 COMPREHEN METABOLIC PANEL: CPT

## 2025-07-04 PROCEDURE — 96375 TX/PRO/DX INJ NEW DRUG ADDON: CPT

## 2025-07-04 PROCEDURE — 700105 HCHG RX REV CODE 258: Performed by: EMERGENCY MEDICINE

## 2025-07-04 PROCEDURE — 36415 COLL VENOUS BLD VENIPUNCTURE: CPT

## 2025-07-04 PROCEDURE — 85025 COMPLETE CBC W/AUTO DIFF WBC: CPT

## 2025-07-04 PROCEDURE — 700111 HCHG RX REV CODE 636 W/ 250 OVERRIDE (IP): Mod: JZ | Performed by: EMERGENCY MEDICINE

## 2025-07-04 RX ORDER — ACETAMINOPHEN 10 MG/ML
1000 INJECTION, SOLUTION INTRAVENOUS ONCE
Status: COMPLETED | OUTPATIENT
Start: 2025-07-04 | End: 2025-07-04

## 2025-07-04 RX ADMIN — ACETAMINOPHEN 1000 MG: 1000 INJECTION INTRAVENOUS at 03:00

## 2025-07-04 RX ADMIN — AMPICILLIN AND SULBACTAM 3 G: 1; 2 INJECTION, POWDER, FOR SOLUTION INTRAMUSCULAR; INTRAVENOUS at 02:30

## 2025-07-04 ASSESSMENT — FIBROSIS 4 INDEX: FIB4 SCORE: 0.6

## 2025-07-04 NOTE — DISCHARGE INSTRUCTIONS
Apply warm moist compresses to the breast with gentle massage  Take the antibiotics until completely gone with food  Increase fluids  Tylenol or ibuprofen as needed for pain  Your breastmilk is still safe to feed your baby.  Return if uncontrolled fever, uncontrolled pain or increased redness

## 2025-07-04 NOTE — ED NOTES
Patient is stable for d/c at this time, anticipatory guidance provided, close follow-up encouraged, and ED return instructions have been detailed. Patient agreeable to the disposition, and plan and discharged home in ambulatory state and good condition.    Rx education provided, pt verbalized understanding.

## 2025-07-04 NOTE — ED PROVIDER NOTES
"ED Provider Note    CHIEF COMPLAINT  Chief Complaint   Patient presents with    Breast Pain     Pt started to have right breast pain 2 days ago. Pt is currently breast feeding. Pt Tmax at home is 102F. Fever does go down with Advil and Tylenol. Pt has tried all the tricks and nothing is working.     Fever       EXTERNAL RECORDS REVIEWED  No pertinent medical records    HPI/ROS  LIMITATION TO HISTORY   Select: : None  OUTSIDE HISTORIAN(S):  none    Amanda Millan is a 35 y.o. female who presents tonight with a chief complaint of right breast pain for the last 2 days.  Patient is currently postpartum by 5 months and is breast-feeding her baby.  She began having a fever with a Tmax of 102 and has been taking Tylenol and ibuprofen for this.  She has tried hot compresses and feels that she has a duct that is blocked.  Her breast is extremely red and painful.    PAST MEDICAL HISTORY       SURGICAL HISTORY   has a past surgical history that includes appendectomy.    FAMILY HISTORY  No family history on file.    SOCIAL HISTORY  Social History     Tobacco Use    Smoking status: Never    Smokeless tobacco: Never   Vaping Use    Vaping status: Never Used   Substance and Sexual Activity    Alcohol use: Not Currently    Drug use: Never    Sexual activity: Not on file       CURRENT MEDICATIONS  Home Medications       Reviewed by Lincoln Tse R.N. (Registered Nurse) on 07/04/25 at 0104  Med List Status: Partial     Medication Last Dose Status   levothyroxine (SYNTHROID) 150 MCG Tab  Active   Prenatal Multivit-Min-Fe-FA (PRENATAL 1 + IRON PO)  Active                  Audit from Redirected Encounters    **Home medications have not yet been reviewed for this encounter**         ALLERGIES  Allergies[1]    PHYSICAL EXAM  VITAL SIGNS: /85   Pulse 71   Temp 36.9 °C (98.5 °F) (Temporal)   Resp 16   Ht 1.753 m (5' 9\")   Wt 72.6 kg (160 lb)   SpO2 98%   Breastfeeding Yes   BMI 23.63 kg/m²    Constitutional: Patient is " well developed, well nourished. Non-toxic appearing.  Moderate distress from her breast pain  HENT: Normocephalic, nares are patent and clear, oral mucosa moist.  Eyes: PERRL, EOMI, Conjunctiva without erythema   Neck: Supple   Lymphatic: No lymphadenopathy noted.   Cardiovascular: Normal heart rate and Regular rhythm. No murmur  Thorax & Lungs: Clear and equal breath sounds with good excursion. No respiratory distress, no wheezing.  Right breast reveals increased erythema and warmth, nipple and areola inflammation and engorgement.  She is very tender in the inferior quadrant upon palpation of the blocked duct/mass.  There is no purulent discharge from the nipple.  Abdomen: Bowel sounds normal in all four quadrants. Soft,nontender, no rebound , guarding, palpable masses.   Skin: Warm, Dry  Extremities: Peripheral pulses 4/4 No edema  Musculoskeletal: Normal range of motion in all major joints. No tenderness to palpation or major deformities noted.   Neurologic: Alert & oriented x 3, Normal motor function, Normal sensory function  Psychiatric: Affect normal, Judgment normal, Mood normal.      Results for orders placed or performed during the hospital encounter of 07/04/25   CBC WITH DIFFERENTIAL    Collection Time: 07/04/25  1:53 AM   Result Value Ref Range    WBC 6.0 4.8 - 10.8 K/uL    RBC 4.87 4.20 - 5.40 M/uL    Hemoglobin 13.1 12.0 - 16.0 g/dL    Hematocrit 39.8 37.0 - 47.0 %    MCV 81.7 81.4 - 97.8 fL    MCH 26.9 (L) 27.0 - 33.0 pg    MCHC 32.9 32.2 - 35.5 g/dL    RDW 46.3 35.9 - 50.0 fL    Platelet Count 305 164 - 446 K/uL    MPV 10.9 9.0 - 12.9 fL    Neutrophils-Polys 47.80 44.00 - 72.00 %    Lymphocytes 33.10 22.00 - 41.00 %    Monocytes 12.20 0.00 - 13.40 %    Eosinophils 5.90 0.00 - 6.90 %    Basophils 0.70 0.00 - 1.80 %    Immature Granulocytes 0.30 0.00 - 0.90 %    Nucleated RBC 0.00 0.00 - 0.20 /100 WBC    Neutrophils (Absolute) 2.85 1.82 - 7.42 K/uL    Lymphs (Absolute) 1.97 1.00 - 4.80 K/uL    Monos  (Absolute) 0.73 0.00 - 0.85 K/uL    Eos (Absolute) 0.35 0.00 - 0.51 K/uL    Baso (Absolute) 0.04 0.00 - 0.12 K/uL    Immature Granulocytes (abs) 0.02 0.00 - 0.11 K/uL    NRBC (Absolute) 0.00 K/uL   COMP METABOLIC PANEL    Collection Time: 07/04/25  1:53 AM   Result Value Ref Range    Sodium 140 135 - 145 mmol/L    Potassium 3.5 (L) 3.6 - 5.5 mmol/L    Chloride 107 96 - 112 mmol/L    Co2 21 20 - 33 mmol/L    Anion Gap 12.0 7.0 - 16.0    Glucose 82 65 - 99 mg/dL    Bun 14 8 - 22 mg/dL    Creatinine 0.73 0.50 - 1.40 mg/dL    Calcium 8.8 8.5 - 10.5 mg/dL    Correct Calcium 9.0 8.5 - 10.5 mg/dL    AST(SGOT) 54 (H) 12 - 45 U/L    ALT(SGPT) 72 (H) 2 - 50 U/L    Alkaline Phosphatase 142 (H) 30 - 99 U/L    Total Bilirubin <0.2 0.1 - 1.5 mg/dL    Albumin 3.8 3.2 - 4.9 g/dL    Total Protein 7.4 6.0 - 8.2 g/dL    Globulin 3.6 (H) 1.9 - 3.5 g/dL    A-G Ratio 1.1 g/dL   ESTIMATED GFR    Collection Time: 07/04/25  1:53 AM   Result Value Ref Range    GFR (CKD-EPI) 110 >60 mL/min/1.73 m 2        RADIOLOGY/PROCEDURES   I have independently interpreted the diagnostic imaging associated with this visit and am waiting the final reading from the radiologist.   My preliminary interpretation is as follows: No abscess    Radiologist interpretation:  US-CHEST    (Results Pending)        COURSE & MEDICAL DECISION MAKING    ASSESSMENT, COURSE AND PLAN  Care Narrative: Patient received an IV with Unasyn 3 g IV.,  Laboratories showed a normal white blood cell count with no left shift.  CMP is unremarkable other than she does have some mildly elevated LFTs with an SGOT of 54, SGPT 72, alk phos 142.  She has no tenderness on palpation of her abdomen.  She was treated with IV Unasyn and Tylenol and feels somewhat improved.  The plan will be to have her continue using warm moist compresses, gentle massage, oral antibiotics and Tylenol and follow-up with her OB/GYN  Working within the next 3 days for recheck.  Return immediately if elevated fever  uncontrolled with Tylenol increase redness swelling or uncontrolled pain.  She will be sent home with a prescription for Augmentin for the next 10 days, return if any problems            DISPOSITION AND DISCUSSIONS  I have discussed management of the patient with the following physicians and LUDY's: None    Discussion of management with other \[facility] [place] [facility]\"" or appropriate source(s): Pharmacy regarding antibiotics.    Escalation of care considered, and ultimately not performed:acute inpatient care management, however at this time, the patient is most appropriate for outpatient management    Barriers to care at this time, including but not limited to: None.     Decision tools and prescription drugs considered including, but not limited to: Augmentin 875 mg #20.    FINAL DIAGNOSIS  1. Acute mastitis of right breast    2. Breast pain    3. Fever in other diseases         Electronically signed by: Kristen Echevarria D.O., 7/4/2025 2:46 AM           [1]   Allergies  Allergen Reactions    [place] Oil Rash, Itching and Swelling     Per pt

## 2025-07-04 NOTE — ED TRIAGE NOTES
"Chief Complaint   Patient presents with    Breast Pain     Pt started to have right breast pain 2 days ago. Pt is currently breast feeding. Pt Tmax at home is 102F. Fever does go down with Advil and Tylenol. Pt has tried all the tricks and nothing is working.     Fever     /85   Pulse 83   Temp 36.9 °C (98.5 °F) (Temporal)   Resp 16   Ht 1.753 m (5' 9\")   Wt 72.6 kg (160 lb)   SpO2 96%   Breastfeeding Yes   BMI 23.63 kg/m²     "

## 2025-07-09 LAB
BACTERIA BLD CULT: NORMAL
BACTERIA BLD CULT: NORMAL
SIGNIFICANT IND 70042: NORMAL
SIGNIFICANT IND 70042: NORMAL
SITE SITE: NORMAL
SITE SITE: NORMAL
SOURCE SOURCE: NORMAL
SOURCE SOURCE: NORMAL